# Patient Record
Sex: FEMALE | Race: OTHER | HISPANIC OR LATINO | URBAN - METROPOLITAN AREA
[De-identification: names, ages, dates, MRNs, and addresses within clinical notes are randomized per-mention and may not be internally consistent; named-entity substitution may affect disease eponyms.]

---

## 2022-08-04 ENCOUNTER — EMERGENCY (EMERGENCY)
Facility: HOSPITAL | Age: 37
LOS: 1 days | Discharge: HOME | End: 2022-08-04
Attending: EMERGENCY MEDICINE
Payer: SELF-PAY

## 2022-08-04 VITALS
SYSTOLIC BLOOD PRESSURE: 120 MMHG | OXYGEN SATURATION: 97 % | HEART RATE: 84 BPM | DIASTOLIC BLOOD PRESSURE: 80 MMHG | TEMPERATURE: 99 F | WEIGHT: 153 LBS | RESPIRATION RATE: 20 BRPM

## 2022-08-04 PROCEDURE — 90715 TDAP VACCINE 7 YRS/> IM: CPT

## 2022-08-04 PROCEDURE — 99284 EMERGENCY DEPT VISIT MOD MDM: CPT

## 2022-08-04 PROCEDURE — 99283 EMERGENCY DEPT VISIT LOW MDM: CPT

## 2022-08-04 PROCEDURE — 99284 EMERGENCY DEPT VISIT MOD MDM: CPT | Mod: 25

## 2022-08-04 PROCEDURE — 90471 IMMUNIZATION ADMIN: CPT

## 2022-08-04 PROCEDURE — 73140 X-RAY EXAM OF FINGER(S): CPT

## 2022-08-04 PROCEDURE — 73140 X-RAY EXAM OF FINGER(S): CPT | Mod: 26,RT

## 2022-08-04 RX ORDER — IBUPROFEN 200 MG
1 TABLET ORAL
Qty: 20 | Refills: 0
Start: 2022-08-04

## 2022-08-04 RX ORDER — ACETAMINOPHEN 500 MG
975 TABLET ORAL ONCE
Refills: 0 | Status: COMPLETED | OUTPATIENT
Start: 2022-08-04 | End: 2022-08-04

## 2022-08-04 RX ORDER — TETANUS TOXOID, REDUCED DIPHTHERIA TOXOID AND ACELLULAR PERTUSSIS VACCINE, ADSORBED 5; 2.5; 8; 8; 2.5 [IU]/.5ML; [IU]/.5ML; UG/.5ML; UG/.5ML; UG/.5ML
0.5 SUSPENSION INTRAMUSCULAR ONCE
Refills: 0 | Status: COMPLETED | OUTPATIENT
Start: 2022-08-04 | End: 2022-08-04

## 2022-08-04 RX ADMIN — Medication 1 TABLET(S): at 13:05

## 2022-08-04 RX ADMIN — Medication 975 MILLIGRAM(S): at 13:04

## 2022-08-04 RX ADMIN — TETANUS TOXOID, REDUCED DIPHTHERIA TOXOID AND ACELLULAR PERTUSSIS VACCINE, ADSORBED 0.5 MILLILITER(S): 5; 2.5; 8; 8; 2.5 SUSPENSION INTRAMUSCULAR at 13:05

## 2022-08-04 NOTE — CONSULT NOTE ADULT - NS ATTEND AMEND GEN_ALL_CORE FT
Patient to be discharged and follow up as outpatient - I and D completed at bedside and dressing applied.  Patient may need further surgery and will await outpatient visit.

## 2022-08-04 NOTE — ED PROVIDER NOTE - OBJECTIVE STATEMENT
38 y/o F no pmhx presents to ER c/o finger injury. she was at work and finger got caught in machine by accident causing avulsion. c/o pain. denies any allergies. unknown last tdap. pt is right hand dominant 36 y/o F no pmhx presents to ER c/o finger injury. she was at work and finger got caught in machine by accident causing avulsion. c/o pain. denies any allergies. unknown last tdap. pt is right hand dominant. piece of finger that was cut off was placed in a chapstick cap and in glove on ice

## 2022-08-04 NOTE — ED PROVIDER NOTE - NS ED ATTENDING STATEMENT MOD
This was a shared visit with the MARIO. I reviewed and verified the documentation and independently performed the documented:

## 2022-08-04 NOTE — ED PROVIDER NOTE - CARE PROVIDER_API CALL
Christopher Hall (DO)  Orthopaedic Surgery  [address] [place] [address]  Phone: [phone]  Fax: [phone]  Follow Up Time:

## 2022-08-04 NOTE — ED PROVIDER NOTE - CLINICAL SUMMARY MEDICAL DECISION MAKING FREE TEXT BOX
pt with ampuation distal 3rd digit bone exposed, xray, updated tdap, abx, analgesia and hand consult pt with amputation distal 3rd digit bone exposed, xray, updated tdap, abx, analgesia and hand consult

## 2022-08-04 NOTE — CONSULT NOTE ADULT - SUBJECTIVE AND OBJECTIVE BOX
Pt Name: ABIGAIL DOMINGO    MRN: [id number]      Patient is a 37y Female presenting to the emergency department with a chief complaint of right hand third digit distal tuft amputation. Patient family at bedside, patient works at [employer], got her finger stuck in machine. Patient is right hand dominant. Patient denies any numbness or tingling, loss of motor. Unsure of last tetanus, denies any allergies.     PAST MEDICAL & SURGICAL HISTORY:  PAST MEDICAL & SURGICAL HISTORY:  No pertinent past medical history    No significant past surgical history    Allergies: No Known Allergies    Medications:     FAMILY HISTORY:  : non-contributory    Social History:     Vital Signs Last 24 Hrs  T(C): 37 (04 Aug 2022 12:13), Max: 37 (04 Aug 2022 12:13)  T(F): 98.6 (04 Aug 2022 12:13), Max: 98.6 (04 Aug 2022 12:13)  HR: 84 (04 Aug 2022 12:13) (84 - 84)  BP: 120/80 (04 Aug 2022 12:13) (120/80 - 120/80)  BP(mean): --  RR: 20 (04 Aug 2022 12:13) (20 - 20)  SpO2: 97% (04 Aug 2022 12:13) (97% - 97%)    Parameters below as of 04 Aug 2022 12:13  Patient On (Oxygen Delivery Method): room air    Daily     Daily     PHYSICAL EXAM:      Appearance: Alert, responsive, in no acute distress.  Right Hand: right hand third digit distal tuft amputation noted at level of mid-nail, bony exposure noted, mild bleeding at amputation site, SILT throughout digit, ROM intact, remainder of digits unremarkable. RP intact.     Imaging Studies:    XR noted    A/P:  Pt is a  37y Female with  found to have right hand 3rd digit distal tuft amputation    PLAN:   * irrigated at bedside with copious betadine/saline  * woudnd dressed with xeroform, cling, and ACE  * tetanus, ABX given in ED  * Pain Control, limit weight bearing to hand  * keep dressing on and dry at all times  * f/u in office with Dr Munson 1 week  * case discussed with Dr Munson Pt Name: ABIGAIL DOMINGO    MRN: [id number]      Patient is a 37y Female presenting to the emergency department with a chief complaint of right hand third digit distal tuft amputation. Patient family at bedside, patient works at [employer], got her finger stuck in machine. Patient is right hand dominant. Patient denies any numbness or tingling, loss of motor. Unsure of last tetanus, denies any allergies.     PAST MEDICAL & SURGICAL HISTORY:  PAST MEDICAL & SURGICAL HISTORY:  No pertinent past medical history    No significant past surgical history    Allergies: No Known Allergies    Medications:     FAMILY HISTORY:  : non-contributory    Social History:     Vital Signs Last 24 Hrs  T(C): 37 (04 Aug 2022 12:13), Max: 37 (04 Aug 2022 12:13)  T(F): 98.6 (04 Aug 2022 12:13), Max: 98.6 (04 Aug 2022 12:13)  HR: 84 (04 Aug 2022 12:13) (84 - 84)  BP: 120/80 (04 Aug 2022 12:13) (120/80 - 120/80)  BP(mean): --  RR: 20 (04 Aug 2022 12:13) (20 - 20)  SpO2: 97% (04 Aug 2022 12:13) (97% - 97%)    Parameters below as of 04 Aug 2022 12:13  Patient On (Oxygen Delivery Method): room air    Daily     Daily     PHYSICAL EXAM:      Appearance: Alert, responsive, in no acute distress.  Right Hand: right hand third digit distal tuft amputation noted at level of mid-nail, bony exposure noted, mild bleeding at amputation site, SILT throughout digit, ROM intact, remainder of digits unremarkable. RP intact.     Imaging Studies:    XR noted    A/P:  Pt is a  37y Female with  found to have right hand 3rd digit distal tuft amputation    PLAN:   * irrigated at bedside with copious betadine/saline  * wound dressed with xeroform, cling, and ACE  * tetanus, ABX given in ED  * Pain Control, limit weight bearing to hand  * keep dressing on and dry at all times  * f/u in office with Dr Munson 1 week  * case discussed with Dr Munson

## 2022-08-04 NOTE — ED PROVIDER NOTE - PATIENT PORTAL LINK FT
You can access the FollowMyHealth Patient Portal offered by [facility] by registering at the following website: [email or web address]. By joining [employer]’s FollowMyHealth portal, you will also be able to view your health information using other applications (apps) compatible with our system.

## 2022-08-04 NOTE — ED PROVIDER NOTE - PHYSICAL EXAMINATION
Gen: No acute distress, non toxic  HEENT: Mucous membranes moist, pink conjunctivae, EOMI  Neuro: A&O x 3, moving all 4 extremities  MSK: ***  Skin: No rashes. intact and perfused. Gen: No acute distress, non toxic  HEENT: Mucous membranes moist, pink conjunctivae, EOMI  Neuro: A&O x 3, moving all 4 extremities  MSK: Right 3rd digit with amputation distally from mid-way through nail distally, distal tuft bone exposed, full ROM digit all joints  Skin: No rashes. intact and perfused. Gen: No acute distress, non toxic  HEENT: Mucous membranes moist, pink conjunctivae, EOMI.  Neuro: A&O x 3, moving all 4 extremities  MSK: Right 3rd digit with amputation distally from mid-way through nail distally, distal tuft bone exposed, full ROM digit all joints  Skin: No rashes. intact and perfused.

## 2022-08-04 NOTE — ED ADULT TRIAGE NOTE - CHIEF COMPLAINT QUOTE
Pt was using a piece of machinery and cut the tip of her finger off. 3rd right digit. Denies any numbness or tingling. Able to move digit. Colleague accompanying patient with fingertip on ice.

## 2022-08-04 NOTE — ED PROVIDER NOTE - NSFOLLOWUPINSTRUCTIONS_ED_ALL_ED_FT
Keep dressing dry and in place  Please take antibiotics as directed  Please take ibuprofen 600mg every 6 hours as needed for pain  Please take tylenol 650mg every 6hours as needed for pain  Follow up with  hand specialist in 1 week  Return to ER for new or worsening symptoms      Mantenga el vendaje seco y en galarza lugar [place] los antibióticos según las indicaciones. [place] ibuprofeno 600 mg cada 6 horas según sea necesario para el dolor [place] tylenol 650 mg cada 6 horas según sea necesario para el dolor Seguimiento con especialista en [place] en 1 semana [place] de emergencias por síntomas nuevos o que empeoran